# Patient Record
Sex: FEMALE | Race: BLACK OR AFRICAN AMERICAN | NOT HISPANIC OR LATINO | URBAN - METROPOLITAN AREA
[De-identification: names, ages, dates, MRNs, and addresses within clinical notes are randomized per-mention and may not be internally consistent; named-entity substitution may affect disease eponyms.]

---

## 2023-09-25 PROBLEM — I10 ESSENTIAL HYPERTENSION: Status: ACTIVE | Noted: 2018-10-31

## 2023-09-25 PROBLEM — R73.9 HYPERGLYCEMIA: Status: ACTIVE | Noted: 2018-10-31

## 2023-09-25 PROBLEM — L28.1 PRURIGO NODULARIS: Status: ACTIVE | Noted: 2023-08-11

## 2023-09-25 PROBLEM — R21 RASH AND OTHER NONSPECIFIC SKIN ERUPTION: Status: ACTIVE | Noted: 2023-08-11

## 2023-09-25 PROBLEM — M15.9 OSTEOARTHRITIS OF MULTIPLE JOINTS: Status: ACTIVE | Noted: 2018-10-31

## 2023-09-25 PROBLEM — E78.00 HYPERCHOLESTEROLEMIA: Status: ACTIVE | Noted: 2018-10-31

## 2023-09-25 PROBLEM — L43.9 LICHEN PLANUS, UNSPECIFIED: Status: ACTIVE | Noted: 2023-08-11

## 2023-09-25 PROBLEM — J44.9 CHRONIC OBSTRUCTIVE LUNG DISEASE (MULTI): Status: ACTIVE | Noted: 2018-10-31

## 2023-09-25 PROBLEM — E55.9 VITAMIN D DEFICIENCY: Status: ACTIVE | Noted: 2018-10-31

## 2023-09-25 RX ORDER — GABAPENTIN 300 MG/1
300 CAPSULE ORAL 3 TIMES DAILY
COMMUNITY
Start: 2020-07-17 | End: 2023-11-08 | Stop reason: ALTCHOICE

## 2023-09-25 RX ORDER — TRIAMTERENE AND HYDROCHLOROTHIAZIDE 37.5; 25 MG/1; MG/1
1 CAPSULE ORAL DAILY
COMMUNITY
Start: 2021-03-15 | End: 2023-11-08 | Stop reason: ALTCHOICE

## 2023-09-25 RX ORDER — LOVASTATIN 20 MG/1
1 TABLET ORAL DAILY
COMMUNITY
Start: 2018-10-31

## 2023-09-25 RX ORDER — LOSARTAN POTASSIUM 100 MG/1
1 TABLET ORAL DAILY
COMMUNITY
Start: 2019-06-25 | End: 2023-11-08 | Stop reason: ALTCHOICE

## 2023-09-25 RX ORDER — IBUPROFEN 600 MG/1
1 TABLET ORAL 3 TIMES DAILY PRN
COMMUNITY
Start: 2020-06-24

## 2023-09-25 RX ORDER — FLUTICASONE FUROATE AND VILANTEROL 100; 25 UG/1; UG/1
1 POWDER RESPIRATORY (INHALATION) DAILY
COMMUNITY
Start: 2019-06-14

## 2023-09-25 RX ORDER — TRIAMCINOLONE ACETONIDE 1 MG/G
1 CREAM TOPICAL
COMMUNITY
Start: 2022-06-06 | End: 2023-11-08 | Stop reason: ALTCHOICE

## 2023-09-25 RX ORDER — TEMAZEPAM 15 MG/1
1 CAPSULE ORAL NIGHTLY PRN
COMMUNITY
Start: 2021-02-24 | End: 2023-11-08 | Stop reason: ALTCHOICE

## 2023-09-25 RX ORDER — CLOBETASOL PROPIONATE 0.5 MG/G
1 OINTMENT TOPICAL
COMMUNITY
Start: 2023-07-18

## 2023-09-25 RX ORDER — AMLODIPINE BESYLATE 10 MG/1
10 TABLET ORAL DAILY
COMMUNITY
Start: 2019-06-25 | End: 2023-11-08 | Stop reason: ALTCHOICE

## 2023-09-25 RX ORDER — TRAMADOL HYDROCHLORIDE 50 MG/1
1 TABLET ORAL 3 TIMES DAILY PRN
COMMUNITY
Start: 2018-10-31 | End: 2023-11-08 | Stop reason: ALTCHOICE

## 2023-09-25 RX ORDER — ALBUTEROL SULFATE 90 UG/1
2 AEROSOL, METERED RESPIRATORY (INHALATION) EVERY 6 HOURS PRN
COMMUNITY
Start: 2019-07-29

## 2023-09-25 RX ORDER — LORAZEPAM 0.5 MG/1
1 TABLET ORAL DAILY PRN
COMMUNITY
Start: 2018-10-31 | End: 2023-11-08 | Stop reason: ALTCHOICE

## 2023-09-25 RX ORDER — CYCLOBENZAPRINE HCL 10 MG
.5-1 TABLET ORAL 3 TIMES DAILY PRN
COMMUNITY
Start: 2019-06-25

## 2023-10-04 ENCOUNTER — CLINICAL SUPPORT (OUTPATIENT)
Dept: DERMATOLOGY | Facility: CLINIC | Age: 68
End: 2023-10-04
Payer: COMMERCIAL

## 2023-10-04 DIAGNOSIS — L43.9 LICHEN PLANUS, UNSPECIFIED: ICD-10-CM

## 2023-10-04 PROCEDURE — 96900 ACTINOTHERAPY UV LIGHT: CPT | Performed by: DERMATOLOGY

## 2023-10-04 NOTE — PROGRESS NOTES
Phototherapy Procedure Note:    Cristian Joseph is a 68 y.o. female who presents for the following: Phototherapy    Phototherapy - Narrow Band UVB  Diagnosis - (also add to Visit Dx): Lichen Planus, Unspecified - L43.9  Treatment Date: 10/04/23  Patient  Verified: Confirmed patient date of birth.  Provider: Dr. Broderick Veloz  [facility]: [facility]  Treatment Number: 17  Schedule: Number of Treatment(s) per week : 3 times per week  Meter Reading in milliwatts: 5.04  UVB Dose Today mJ/Cm2 (millijoules): 1250  UVB Time of Exposure Time in Minutes : Seconds: 4:08  Topical: None  Face and Eye Shielding: Goggles, Towel  Genital Shielding: Underwear  Reaction-Redness/Erythema Grade: 0 - No erythema  NB UVB Reaction - Tenderness: No Tenderness  NB UVB Dose Increased by mJ/Cm2: 60

## 2023-10-06 ENCOUNTER — APPOINTMENT (OUTPATIENT)
Dept: DERMATOLOGY | Facility: CLINIC | Age: 68
End: 2023-10-06
Payer: COMMERCIAL

## 2023-10-09 ENCOUNTER — CLINICAL SUPPORT (OUTPATIENT)
Dept: DERMATOLOGY | Facility: CLINIC | Age: 68
End: 2023-10-09
Payer: COMMERCIAL

## 2023-10-09 DIAGNOSIS — L43.9 LICHEN PLANUS: ICD-10-CM

## 2023-10-09 PROCEDURE — 96900 ACTINOTHERAPY UV LIGHT: CPT | Performed by: DERMATOLOGY

## 2023-10-09 NOTE — PROGRESS NOTES
Phototherapy Procedure Note:    Cristian Joseph is a 68 y.o. female who presents for the following: Phototherapy    Phototherapy - Narrow Band UVB  Diagnosis - (also add to Visit Dx): Lichen Planus, Unspecified - L43.9  Treatment Date: 10/09/23  Patient  Verified: Confirmed patient date of birth.  Provider: Elin  [facility]  Treatment Number: 18  Schedule: Number of Treatment(s) per week : 3 times per week  Meter Reading in milliwatts: 5.10  UVB Dose Today mJ/Cm2 (millijoules): 1310  UVB Time of Exposure Time in Minutes : Seconds: 4:17  Topical: None  Face and Eye Shielding: Goggles  Genital Shielding: Underwear  Reaction-Redness/Erythema Grade: 0 - No erythema  NB UVB Reaction - Tenderness: No Tenderness  NB UVB Dose Increased by mJ/Cm2: 60

## 2023-10-11 ENCOUNTER — APPOINTMENT (OUTPATIENT)
Dept: DERMATOLOGY | Facility: CLINIC | Age: 68
End: 2023-10-11
Payer: COMMERCIAL

## 2023-10-13 ENCOUNTER — CLINICAL SUPPORT (OUTPATIENT)
Dept: DERMATOLOGY | Facility: CLINIC | Age: 68
End: 2023-10-13
Payer: COMMERCIAL

## 2023-10-13 DIAGNOSIS — L43.9 LICHEN PLANUS: ICD-10-CM

## 2023-10-13 PROCEDURE — 96900 ACTINOTHERAPY UV LIGHT: CPT | Performed by: STUDENT IN AN ORGANIZED HEALTH CARE EDUCATION/TRAINING PROGRAM

## 2023-10-13 NOTE — PROGRESS NOTES
Phototherapy Procedure Note:    Cristian Joseph is a 68 y.o. female who presents for the following: Phototherapy    Phototherapy - Narrow Band UVB  Diagnosis - (also add to Visit Dx): Lichen Planus, Unspecified - L43.9  Treatment Date: 10/13/23  Patient  Verified: Confirmed patient date of birth.  Provider: Dr. Chatman  [facility]  Treatment Number: 19  Schedule: Number of Treatment(s) per week : 3 times per week  Meter Reading in milliwatts: 5.08  UVB Dose Today mJ/Cm2 (millijoules): 1250  UVB Time of Exposure Time in Minutes : Seconds: 4:06  Topical: None  Face and Eye Shielding: Goggles  Genital Shielding: Underwear  Reaction-Redness/Erythema Grade: 0 - No erythema  NB UVB Reaction - Tenderness: No Tenderness  NB UVB Dose Hold At mJ/Cm2: 1250 (Paper chart entry missing. Plan to increase pt at next visit if no issues.)

## 2023-10-16 ENCOUNTER — CLINICAL SUPPORT (OUTPATIENT)
Dept: DERMATOLOGY | Facility: CLINIC | Age: 68
End: 2023-10-16
Payer: COMMERCIAL

## 2023-10-16 DIAGNOSIS — L43.9 LICHEN PLANUS, UNSPECIFIED: ICD-10-CM

## 2023-10-16 PROCEDURE — 96900 ACTINOTHERAPY UV LIGHT: CPT | Performed by: DERMATOLOGY

## 2023-10-16 NOTE — PROGRESS NOTES
Phototherapy Procedure Note:    Subjective   Shobha Joseph is a 68 y.o. female who presents for the following: Phototherapy    Phototherapy - Narrow Band UVB  Diagnosis - (also add to Visit Dx): Lichen Planus, Unspecified - L43.9  Treatment Date: 10/16/23  Patient  Verified: Confirmed patient date of birth.  Provider: coni  [facility]  Treatment Number: 20  Schedule: Number of Treatment(s) per week : 3 times per week  Meter Reading in milliwatts: 5.07  UVB Dose Today mJ/Cm2 (millijoules): 1310  UVB Time of Exposure Time in Minutes : Seconds: 4:18  Topical: None  Face and Eye Shielding: Goggles  Genital Shielding: Underwear  Other Shielding: Eyes, Face  Reaction-Redness/Erythema Grade: 0 - No erythema  NB UVB Reaction - Tenderness: No Tenderness  Treatment Comments: start at 350mj/cm2. increase by 60mj/cm2 qtx 3x per week as tolerated. when 1500mj/cm2 is reached, hold and notify provider.  NB UVB Dose Increased by mJ/Cm2: 60

## 2023-10-18 ENCOUNTER — CLINICAL SUPPORT (OUTPATIENT)
Dept: DERMATOLOGY | Facility: CLINIC | Age: 68
End: 2023-10-18
Payer: COMMERCIAL

## 2023-10-18 DIAGNOSIS — L43.9 LICHEN PLANUS: ICD-10-CM

## 2023-10-18 PROCEDURE — 96900 ACTINOTHERAPY UV LIGHT: CPT | Performed by: DERMATOLOGY

## 2023-10-18 NOTE — PROGRESS NOTES
Phototherapy Procedure Note:    Cristian Joseph is a 68 y.o. female who presents for the following: Phototherapy    Phototherapy - Narrow Band UVB  Diagnosis - (also add to Visit Dx): Lichen Planus, Unspecified - L43.9  Treatment Date: 10/18/23  Patient  Verified: Confirmed patient date of birth.  Provider: renetta kellogg  [facility]  Treatment Number: 21  Schedule: Number of Treatment(s) per week : 3 times per week  Meter Reading in milliwatts: 5.08  UVB Dose Today mJ/Cm2 (millijoules): 1370  UVB Time of Exposure Time in Minutes : Seconds: 424  Topical: None  Face and Eye Shielding: Goggles, Towel  Genital Shielding: Underwear  Reaction-Redness/Erythema Grade: 0 - No erythema  NB UVB Reaction - Tenderness: No Tenderness  NB UVB Dose Increased by mJ/Cm2: 60

## 2023-10-20 ENCOUNTER — APPOINTMENT (OUTPATIENT)
Dept: DERMATOLOGY | Facility: CLINIC | Age: 68
End: 2023-10-20
Payer: COMMERCIAL

## 2023-10-23 ENCOUNTER — CLINICAL SUPPORT (OUTPATIENT)
Dept: DERMATOLOGY | Facility: CLINIC | Age: 68
End: 2023-10-23
Payer: COMMERCIAL

## 2023-10-23 DIAGNOSIS — L43.9 LICHEN PLANUS, UNSPECIFIED: ICD-10-CM

## 2023-10-23 PROCEDURE — 96900 ACTINOTHERAPY UV LIGHT: CPT | Performed by: DERMATOLOGY

## 2023-10-23 NOTE — PROGRESS NOTES
Phototherapy Procedure Note:    Cristian Joseph is a 68 y.o. female who presents for the following: Phototherapy    Phototherapy - Narrow Band UVB  Diagnosis - (also add to Visit Dx): Lichen Planus, Unspecified - L43.9  Treatment Date: 10/23/23  Patient  Verified: Confirmed patient date of birth.  Provider: coni  [facility]  Treatment Number: 22  Schedule: Number of Treatment(s) per week : 3 times per week  Meter Reading in milliwatts: 5.06  UVB Dose Today mJ/Cm2 (millijoules): 1430  UVB Time of Exposure Time in Minutes : Seconds: 4:43  Topical: None  Face and Eye Shielding: Goggles, Towel  Other Shielding: Eyes, Face  Reaction-Redness/Erythema Grade: 0 - No erythema  NB UVB Reaction - Tenderness: No Tenderness  NB UVB Dose Increased by mJ/Cm2: 60

## 2023-10-25 ENCOUNTER — CLINICAL SUPPORT (OUTPATIENT)
Dept: DERMATOLOGY | Facility: CLINIC | Age: 68
End: 2023-10-25
Payer: COMMERCIAL

## 2023-10-25 DIAGNOSIS — L43.9 LICHEN PLANUS, UNSPECIFIED: ICD-10-CM

## 2023-10-25 PROCEDURE — 96900 ACTINOTHERAPY UV LIGHT: CPT | Performed by: DERMATOLOGY

## 2023-10-25 NOTE — PROGRESS NOTES
Phototherapy Procedure Note:    Subjective   Shobha Joseph is a 68 y.o. female who presents for the following: No chief complaint on file.    Phototherapy - Narrow Band UVB  Diagnosis - (also add to Visit Dx): Lichen Planus, Other - L43.8  Treatment Date: 10/25/23  Patient  Verified: Confirmed patient date of birth.  Provider: coni  [facility]  Treatment Number:   Schedule: Number of Treatment(s) per week : 3 times per week  Meter Reading in milliwatts: 5.05  UVB Dose Today mJ/Cm2 (millijoules): 1490  Topical: None  Face and Eye Shielding: Goggles, Towel  Other Shielding: Eyes, Face  Reaction-Redness/Erythema Grade: 0 - No erythema  NB UVB Reaction - Tenderness: No Tenderness  NB UVB Dose Increased by mJ/Cm2: 60

## 2023-10-27 ENCOUNTER — APPOINTMENT (OUTPATIENT)
Dept: DERMATOLOGY | Facility: CLINIC | Age: 68
End: 2023-10-27
Payer: COMMERCIAL

## 2023-10-27 ENCOUNTER — TELEPHONE (OUTPATIENT)
Dept: DERMATOLOGY | Facility: CLINIC | Age: 68
End: 2023-10-27

## 2023-10-27 NOTE — TELEPHONE ENCOUNTER
Patient called the  today needing to cancel appointment due to a rash or hives. Patient was called back. She had a CT earlier in the week on Tuesday with contrast. After the CT she broke out in a rash or hives all over. After her photo treatment this past Wednesday, the rash got worse. Today, the rash is located on her arms and chest. She denies having a shellfish allergy or trouble with breathing. The patient has been taking Benadryl. Patient was encouraged to go to Urgent Care to the ED where she had her CT performed to be evaluated.

## 2023-11-01 ENCOUNTER — APPOINTMENT (OUTPATIENT)
Dept: DERMATOLOGY | Facility: CLINIC | Age: 68
End: 2023-11-01
Payer: COMMERCIAL

## 2023-11-03 ENCOUNTER — APPOINTMENT (OUTPATIENT)
Dept: DERMATOLOGY | Facility: CLINIC | Age: 68
End: 2023-11-03
Payer: COMMERCIAL

## 2023-11-06 ENCOUNTER — APPOINTMENT (OUTPATIENT)
Dept: DERMATOLOGY | Facility: CLINIC | Age: 68
End: 2023-11-06
Payer: COMMERCIAL

## 2023-11-08 ENCOUNTER — APPOINTMENT (OUTPATIENT)
Dept: DERMATOLOGY | Facility: CLINIC | Age: 68
End: 2023-11-08
Payer: COMMERCIAL

## 2023-11-08 ENCOUNTER — OFFICE VISIT (OUTPATIENT)
Dept: DERMATOLOGY | Facility: CLINIC | Age: 68
End: 2023-11-08
Payer: COMMERCIAL

## 2023-11-08 DIAGNOSIS — L43.9 LICHEN PLANUS: Primary | ICD-10-CM

## 2023-11-08 PROCEDURE — 99214 OFFICE O/P EST MOD 30 MIN: CPT | Performed by: DERMATOLOGY

## 2023-11-08 PROCEDURE — 1159F MED LIST DOCD IN RCRD: CPT | Performed by: DERMATOLOGY

## 2023-11-08 PROCEDURE — 1160F RVW MEDS BY RX/DR IN RCRD: CPT | Performed by: DERMATOLOGY

## 2023-11-08 RX ORDER — BETAMETHASONE DIPROPIONATE 0.5 MG/G
OINTMENT TOPICAL
Qty: 135 G | Refills: 0 | Status: SHIPPED | OUTPATIENT
Start: 2023-11-08

## 2023-11-08 ASSESSMENT — ITCH NUMERIC RATING SCALE: HOW SEVERE IS YOUR ITCHING?: 3

## 2023-11-08 NOTE — PROGRESS NOTES
Subjective     Shobha Joseph is a 68 y.o. female who presents for the following: Rash (Pt states she had a CAT scan 3 weeks ago and the next day after phototherapy her arms started itching and she broke out in hives. Tried clobetasol with no response. ) and lichen planus (Lower legs- pt states used clobetasol and legs have improved. ).     Review of Systems:  No other skin or systemic complaints other than what is documented elsewhere in the note.    The following portions of the chart were reviewed this encounter and updated as appropriate:   Allergies  Meds  Problems  Med Hx  Surg Hx  Fam Hx         Skin Cancer History  No skin cancer on file.      Specialty Problems          Dermatology Problems    Lichen planus, unspecified    Prurigo nodularis    Rash and other nonspecific skin eruption        Objective   Well appearing patient in no apparent distress; mood and affect are within normal limits.    A focused skin examination was performed. All findings within normal limits unless otherwise noted below.    Assessment/Plan   1. 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 Patient recently underwent CT with radiocontrast media and had significant flare of dermatitis with concomitant hives; hives now resolved but flared lichenoid dermatitis continues. Patient with significant pruritus interfering with sleep.  -After discussion today, patient is presently taking ibuprofen daily, and when hydrochlorothiazide was discontinued, patient was started on carvedilol (B blocker) and amlodipine (CCB) both of which may also result in lichenoid drug eruption and/or fixed drug eruption  -Recommend for patient to trial off of ibuprofen, try acetaminophen instead for pain relief  temporarily  -If this does not result in relief, recommend for patient to discuss with PCP for anti hypertensive options that are not associated with lichenoid drug eruption/fixed drug eruption. Common culprit medications for these drug eruptions generated and printed for the patient today to discuss with PCP when considering alternative therapy.  -Patient may restart NB UVB if and when desired; declines presently  -Discontinue clobetasol; likely tachyphylaxed. Start beta dip ointment BID to active areas.    Related Medications  betamethasone dipropionate (Diprosone) 0.05 % ointment  Apply to affected areas twice daily when active as needed.        Follow up in 3-4 months for lichenoid dermatitis/drug eruption  Discussed if there are any changes or development of concerning symptoms (lesion/skin condition is changing, bleeding, enlarging, or worsening) the patient is to contact my office. The patient verbalizes understanding.    Deja Kinsey MD  11/8/2023

## 2023-11-10 ENCOUNTER — APPOINTMENT (OUTPATIENT)
Dept: DERMATOLOGY | Facility: CLINIC | Age: 68
End: 2023-11-10
Payer: COMMERCIAL

## 2023-11-13 ENCOUNTER — APPOINTMENT (OUTPATIENT)
Dept: DERMATOLOGY | Facility: CLINIC | Age: 68
End: 2023-11-13
Payer: COMMERCIAL

## 2023-11-17 ENCOUNTER — APPOINTMENT (OUTPATIENT)
Dept: DERMATOLOGY | Facility: CLINIC | Age: 68
End: 2023-11-17
Payer: COMMERCIAL

## 2023-11-20 ENCOUNTER — APPOINTMENT (OUTPATIENT)
Dept: DERMATOLOGY | Facility: CLINIC | Age: 68
End: 2023-11-20
Payer: COMMERCIAL

## 2023-11-22 ENCOUNTER — APPOINTMENT (OUTPATIENT)
Dept: DERMATOLOGY | Facility: CLINIC | Age: 68
End: 2023-11-22
Payer: COMMERCIAL

## 2023-11-27 ENCOUNTER — APPOINTMENT (OUTPATIENT)
Dept: DERMATOLOGY | Facility: CLINIC | Age: 68
End: 2023-11-27
Payer: COMMERCIAL

## 2023-11-29 ENCOUNTER — APPOINTMENT (OUTPATIENT)
Dept: DERMATOLOGY | Facility: CLINIC | Age: 68
End: 2023-11-29
Payer: COMMERCIAL

## 2024-04-18 ENCOUNTER — DOCUMENTATION (OUTPATIENT)
Dept: PRIMARY CARE | Facility: CLINIC | Age: 69
End: 2024-04-18
Payer: COMMERCIAL